# Patient Record
Sex: MALE | Race: BLACK OR AFRICAN AMERICAN
[De-identification: names, ages, dates, MRNs, and addresses within clinical notes are randomized per-mention and may not be internally consistent; named-entity substitution may affect disease eponyms.]

---

## 2019-07-30 ENCOUNTER — HOSPITAL ENCOUNTER (EMERGENCY)
Dept: HOSPITAL 53 - M ED | Age: 19
Discharge: HOME | End: 2019-07-30
Payer: COMMERCIAL

## 2019-07-30 VITALS — HEIGHT: 71 IN | BODY MASS INDEX: 25.53 KG/M2 | WEIGHT: 182.39 LBS

## 2019-07-30 VITALS — SYSTOLIC BLOOD PRESSURE: 112 MMHG | DIASTOLIC BLOOD PRESSURE: 58 MMHG

## 2019-07-30 DIAGNOSIS — L70.9: Primary | ICD-10-CM

## 2020-03-05 ENCOUNTER — HOSPITAL ENCOUNTER (EMERGENCY)
Dept: HOSPITAL 53 - M ED | Age: 20
Discharge: HOME | End: 2020-03-05
Payer: COMMERCIAL

## 2020-03-05 VITALS — WEIGHT: 195.11 LBS | BODY MASS INDEX: 27.31 KG/M2 | HEIGHT: 71 IN

## 2020-03-05 VITALS — SYSTOLIC BLOOD PRESSURE: 140 MMHG | DIASTOLIC BLOOD PRESSURE: 70 MMHG

## 2020-03-05 DIAGNOSIS — F17.200: ICD-10-CM

## 2020-03-05 DIAGNOSIS — M25.562: Primary | ICD-10-CM

## 2020-03-06 NOTE — REP
Left knee series:  Five views.

 

History:  Pain.  No known injury.

 

Findings:  Five views of the left knee demonstrate normal bones, joints, and soft

tissues.  No fracture or subluxation seen.

 

Impression:

 

Negative radiographs of the left knee.

 

 

Electronically Signed by

Omar Ordonez MD 03/06/2020 08:25 A

## 2020-12-06 ENCOUNTER — HOSPITAL ENCOUNTER (EMERGENCY)
Dept: HOSPITAL 53 - M ED | Age: 20
Discharge: HOME | End: 2020-12-06
Payer: COMMERCIAL

## 2020-12-06 VITALS — SYSTOLIC BLOOD PRESSURE: 139 MMHG | DIASTOLIC BLOOD PRESSURE: 82 MMHG

## 2020-12-06 VITALS — HEIGHT: 71 IN | BODY MASS INDEX: 26.64 KG/M2 | WEIGHT: 190.26 LBS

## 2020-12-06 DIAGNOSIS — K08.89: Primary | ICD-10-CM
